# Patient Record
Sex: MALE | URBAN - METROPOLITAN AREA
[De-identification: names, ages, dates, MRNs, and addresses within clinical notes are randomized per-mention and may not be internally consistent; named-entity substitution may affect disease eponyms.]

---

## 2021-02-12 ENCOUNTER — IMMUNIZATIONS (OUTPATIENT)
Dept: FAMILY MEDICINE CLINIC | Facility: HOSPITAL | Age: 71
End: 2021-02-12

## 2021-02-12 DIAGNOSIS — Z23 ENCOUNTER FOR IMMUNIZATION: Primary | ICD-10-CM

## 2021-02-12 PROCEDURE — 91301 SARS-COV-2 / COVID-19 MRNA VACCINE (MODERNA) 100 MCG: CPT

## 2021-02-12 PROCEDURE — 0011A SARS-COV-2 / COVID-19 MRNA VACCINE (MODERNA) 100 MCG: CPT

## 2021-03-11 ENCOUNTER — IMMUNIZATIONS (OUTPATIENT)
Dept: FAMILY MEDICINE CLINIC | Facility: HOSPITAL | Age: 71
End: 2021-03-11

## 2021-03-11 DIAGNOSIS — Z23 ENCOUNTER FOR IMMUNIZATION: Primary | ICD-10-CM

## 2021-03-11 PROCEDURE — 91301 SARS-COV-2 / COVID-19 MRNA VACCINE (MODERNA) 100 MCG: CPT

## 2021-03-11 PROCEDURE — 0012A SARS-COV-2 / COVID-19 MRNA VACCINE (MODERNA) 100 MCG: CPT

## 2023-02-23 ENCOUNTER — TELEPHONE (OUTPATIENT)
Dept: UROLOGY | Facility: AMBULATORY SURGERY CENTER | Age: 73
End: 2023-02-23

## 2023-02-23 NOTE — TELEPHONE ENCOUNTER
Patient calling to confirm fax number for jocelynn to fax records  Confirmed fax number [phone]    Patient will call back to schedule appt

## 2023-05-08 ENCOUNTER — OFFICE VISIT (OUTPATIENT)
Dept: UROLOGY | Facility: CLINIC | Age: 73
End: 2023-05-08

## 2023-05-08 VITALS
WEIGHT: 194.8 LBS | DIASTOLIC BLOOD PRESSURE: 90 MMHG | TEMPERATURE: 98.1 F | HEIGHT: 66 IN | HEART RATE: 67 BPM | OXYGEN SATURATION: 97 % | BODY MASS INDEX: 31.31 KG/M2 | SYSTOLIC BLOOD PRESSURE: 148 MMHG

## 2023-05-08 DIAGNOSIS — R35.1 BPH ASSOCIATED WITH NOCTURIA: Primary | ICD-10-CM

## 2023-05-08 DIAGNOSIS — N40.1 BPH ASSOCIATED WITH NOCTURIA: Primary | ICD-10-CM

## 2023-05-08 LAB
SL AMB  POCT GLUCOSE, UA: NORMAL
SL AMB LEUKOCYTE ESTERASE,UA: NORMAL
SL AMB POCT BILIRUBIN,UA: NORMAL
SL AMB POCT BLOOD,UA: NORMAL
SL AMB POCT CLARITY,UA: CLEAR
SL AMB POCT COLOR,UA: YELLOW
SL AMB POCT KETONES,UA: NORMAL
SL AMB POCT NITRITE,UA: NORMAL
SL AMB POCT PH,UA: 7
SL AMB POCT SPECIFIC GRAVITY,UA: 1.01
SL AMB POCT URINE PROTEIN: NORMAL
SL AMB POCT UROBILINOGEN: 0.2

## 2023-05-08 RX ORDER — TADALAFIL 5 MG/1
5 TABLET ORAL DAILY PRN
Qty: 90 TABLET | Refills: 3 | Status: SHIPPED | OUTPATIENT
Start: 2023-05-08

## 2023-05-08 RX ORDER — SIMVASTATIN 20 MG
TABLET ORAL
COMMUNITY
Start: 2023-03-13

## 2023-05-08 RX ORDER — TAMSULOSIN HYDROCHLORIDE 0.4 MG/1
0.4 CAPSULE ORAL
Qty: 90 CAPSULE | Refills: 3 | Status: SHIPPED | OUTPATIENT
Start: 2023-05-08

## 2023-05-08 NOTE — PROGRESS NOTES
UROLOGY PROGRESS NOTE         NAME: Nicole Dubon  AGE: 74 y o  SEX: male  : 1950   MRN: [id number]    DATE: 2023  TIME: 11:50 AM    Assessment and Plan      Impression:   1  BPH associated with nocturia  -     POCT urine dip auto non-scope  -     PSA Total, Diagnostic; Future  -     tadalafil (CIALIS) 5 MG tablet; Take 1 tablet (5 mg total) by mouth daily as needed for erectile dysfunction  -     tamsulosin (FLOMAX) 0 4 mg; Take 1 capsule (0 4 mg total) by mouth daily with dinner      Status post UroLift 2-1/2 years ago  Symptoms slowly worsening  Back on Flomax  AUA symptom score 19  Options discussed  Remains sexually active   Plan: Add Cialis 5 mg daily  They will use good Rx  Continue the Flomax  He is due for PSA  Slip given  Follow-up 1 year unless troubles      Chief Complaint   No chief complaint on file  History of Present Illness     HPI: Nicole Dubon is a 74y o  year old male who presents with follow-up with regard to his BPH  Back on Flomax 1 daily  Still with some bothersome symptoms despite the UroLift procedure done 2 and half years ago  No gross hematuria  No incontinence  He does have some spraying of his flow with difficulty controlling at times  Options all discussed  The following portions of the patient's history were reviewed and updated as appropriate: allergies, current medications, past family history, past medical history, past social history, past surgical history and problem list   Past Medical History:   Diagnosis Date   • Bladder diverticulum    • BPH (benign prostatic hyperplasia)    • Urinary retention      Past Surgical History:   Procedure Laterality Date   • INGUINAL HERNIA REPAIR     • OTHER SURGICAL HISTORY  10/22/2020    urolift     shoulder  Review of Systems     Const: Denies chills, fever and weight loss  CV: Denies chest pain  Resp: Denies SOB  GI: Denies abdominal pain, nausea and vomiting    : Denies symptoms other "than stated above  Musculo: Denies back pain  Objective   /90 (BP Location: Left arm, Patient Position: Sitting, Cuff Size: Adult)   Pulse 67   Temp 98 1 °F (36 7 °C)   Ht 5' 6\" (1 676 m)   Wt 88 4 kg (194 lb 12 8 oz)   SpO2 97%   BMI 31 44 kg/m²     Physical Exam  Const: Appears healthy and well developed  No signs of acute distress present  Resp: Respirations are regular and unlabored  CV: Rate is regular  Rhythm is regular  Abdomen: Abdomen is soft, nontender, and nondistended  Kidneys are not palpable  : Exam benign  Prostate 1+ benign  No meatal stenosis  Psych: Patient's attitude is cooperative   Mood is normal  Affect is normal     Current Medications     Current Outpatient Medications:   •  APPLE CIDER VINEGAR PO, Take 600 mg by mouth, Disp: , Rfl:   •  aspirin (ECOTRIN LOW STRENGTH) 81 mg EC tablet, Take 81 mg by mouth, Disp: , Rfl:   •  Cholecalciferol 25 MCG (1000 UT) tablet, Take 1,000 Units by mouth, Disp: , Rfl:   •  Coenzyme Q10 10 MG capsule, Take 100 mg by mouth, Disp: , Rfl:   •  Diclofenac Sodium (VOLTAREN) 1 %, Apply 2 g topically 4 (four) times a day, Disp: , Rfl:   •  loratadine (CLARITIN) 10 mg tablet, Take 10 mg by mouth, Disp: , Rfl:   •  Multiple Vitamin (MULTIVITAMIN PO), Take 1 tablet by mouth daily, Disp: , Rfl:   •  Omega-3 Fatty Acids (FISH OIL PO), Take 1,400 mg by mouth, Disp: , Rfl:   •  tadalafil (CIALIS) 5 MG tablet, Take 1 tablet (5 mg total) by mouth daily as needed for erectile dysfunction, Disp: 90 tablet, Rfl: 3  •  tamsulosin (FLOMAX) 0 4 mg, Take 1 capsule (0 4 mg total) by mouth daily with dinner, Disp: 90 capsule, Rfl: 3  •  simvastatin (ZOCOR) 20 mg tablet, TAKE 1 TABLET BY MOUTH EVERY DAY AT NIGHT, Disp: , Rfl:   •  triamcinolone (KENALOG) 0 1 % ointment, APPLY TO AFFECTED AREA EVERY DAY - SPARINGLY, Disp: , Rfl:   •  TURMERIC PO, Take 500 mg by mouth (Patient not taking: Reported on 5/8/2023), Disp: , Rfl:         Jeremy Pop, MD        "

## 2024-03-05 ENCOUNTER — NURSE TRIAGE (OUTPATIENT)
Age: 74
End: 2024-03-05

## 2024-03-05 DIAGNOSIS — N40.1 BPH ASSOCIATED WITH NOCTURIA: Primary | ICD-10-CM

## 2024-03-05 DIAGNOSIS — R35.1 BPH ASSOCIATED WITH NOCTURIA: Primary | ICD-10-CM

## 2024-03-05 NOTE — TELEPHONE ENCOUNTER
Patient's wife called. Patient had triple cardiac bypass on 2/23/24 and was taken of Cialis and told he cannot be on that. Inquiring if there is another med he can be put on for the BPH as he is having frequent urination now that he is off the Cialis.     # [phone]

## 2024-03-15 RX ORDER — TAMSULOSIN HYDROCHLORIDE 0.4 MG/1
0.4 CAPSULE ORAL 2 TIMES DAILY
Qty: 180 CAPSULE | Refills: 3 | Status: SHIPPED | OUTPATIENT
Start: 2024-03-15

## 2024-03-15 NOTE — TELEPHONE ENCOUNTER
Pt's wife called back. Informed her of MD's note to take flomax twice a day, morning and evening and does not need to take with food. Advised a 90 day script was sent to pharmacy. No further assistance needed.

## 2024-05-06 RX ORDER — POTASSIUM CHLORIDE 1500 MG/1
20 TABLET, EXTENDED RELEASE ORAL DAILY
COMMUNITY
Start: 2024-02-28

## 2024-05-06 RX ORDER — NITROGLYCERIN 0.4 MG/1
0.3 TABLET SUBLINGUAL
COMMUNITY
Start: 2024-02-08

## 2024-05-06 RX ORDER — FUROSEMIDE 40 MG/1
40 TABLET ORAL DAILY
COMMUNITY
Start: 2024-02-28

## 2024-05-06 RX ORDER — AMIODARONE HYDROCHLORIDE 200 MG/1
200 TABLET ORAL DAILY
COMMUNITY
Start: 2024-02-28

## 2024-05-06 RX ORDER — OXYCODONE HYDROCHLORIDE 5 MG/1
5 TABLET ORAL EVERY 4 HOURS PRN
COMMUNITY
Start: 2024-02-28

## 2024-05-06 RX ORDER — CLOPIDOGREL BISULFATE 75 MG/1
75 TABLET ORAL DAILY
COMMUNITY
Start: 2024-02-28

## 2024-05-06 RX ORDER — METOPROLOL SUCCINATE 25 MG/1
25 TABLET, EXTENDED RELEASE ORAL DAILY
COMMUNITY
Start: 2024-02-08

## 2024-05-08 ENCOUNTER — OFFICE VISIT (OUTPATIENT)
Dept: UROLOGY | Facility: CLINIC | Age: 74
End: 2024-05-08
Payer: COMMERCIAL

## 2024-05-08 VITALS
WEIGHT: 171.5 LBS | TEMPERATURE: 98.2 F | DIASTOLIC BLOOD PRESSURE: 64 MMHG | SYSTOLIC BLOOD PRESSURE: 122 MMHG | HEART RATE: 68 BPM | OXYGEN SATURATION: 99 % | BODY MASS INDEX: 26.92 KG/M2 | HEIGHT: 67 IN

## 2024-05-08 DIAGNOSIS — R35.1 BENIGN PROSTATIC HYPERPLASIA WITH NOCTURIA: Primary | ICD-10-CM

## 2024-05-08 DIAGNOSIS — N40.1 BPH ASSOCIATED WITH NOCTURIA: ICD-10-CM

## 2024-05-08 DIAGNOSIS — R35.1 BPH ASSOCIATED WITH NOCTURIA: ICD-10-CM

## 2024-05-08 DIAGNOSIS — N40.1 BENIGN PROSTATIC HYPERPLASIA WITH NOCTURIA: Primary | ICD-10-CM

## 2024-05-08 PROCEDURE — 99214 OFFICE O/P EST MOD 30 MIN: CPT | Performed by: UROLOGY

## 2024-05-08 RX ORDER — TAMSULOSIN HYDROCHLORIDE 0.4 MG/1
0.4 CAPSULE ORAL 2 TIMES DAILY
Qty: 180 CAPSULE | Refills: 3 | Status: SHIPPED | OUTPATIENT
Start: 2024-05-08

## 2024-05-08 NOTE — PROGRESS NOTES
UROLOGY PROGRESS NOTE         NAME: Paulo Motta  AGE: 74 y.o. SEX: male  : 1950   MRN: [id number]    DATE: 2024  TIME: 11:28 AM    Assessment and Plan      Impression:   1. Benign prostatic hyperplasia with nocturia    Patient just had open heart surgery.  Nocturia twice a night.  Voiding relatively small amounts with slower flow but is comfortable.  He is sexually active and was able to function without the Cialis.  Is been discontinued since he had open heart surgery.     Plan: Will see him in a year.  Continue Flomax twice daily.  Consider procedure in the future.  Restart Cialis when cardiologist improvement.      Chief Complaint     Chief Complaint   Patient presents with    Follow-up     History of Present Illness     HPI: Paulo Motta is a 74 y.o. year old male who presents with history of BPH status post UroLift a few years ago.  His symptoms have slowly returned.  He is currently on twice daily Flomax since his Cialis was discontinued.  He was doing well on a combination of Flomax and Cialis.  He had open heart surgery 9 weeks ago.  The Cialis was discontinued at that time.  He has not needed any nitrates however.  He is just getting back to exercise.  Nocturia 2 times a night.  Slower flow.  He does not double void.  Had some urgency however doubling the Flomax has seemed to help that.  No incontinence.  No hematuria.  No dysuria.  He and his wife were able to have intercourse last night without Cialis.              The following portions of the patient's history were reviewed and updated as appropriate: allergies, current medications, past family history, past medical history, past social history, past surgical history and problem list.  Past Medical History:   Diagnosis Date    Bladder diverticulum     BPH (benign prostatic hyperplasia)     with nocturia    Urinary retention      Past Surgical History:   Procedure Laterality Date    INGUINAL HERNIA REPAIR      OTHER SURGICAL  "HISTORY  10/22/2020    urolift     shoulder  Review of Systems     Const: Denies chills, fever and weight loss.  CV: Denies chest pain.  Resp: Denies SOB.  GI: Denies abdominal pain, nausea and vomiting.  : Denies symptoms other than stated above.  Musculo: Denies back pain.    Objective   /64 (BP Location: Left arm, Patient Position: Sitting, Cuff Size: Adult)   Pulse 68   Temp 98.2 °F (36.8 °C) (Temporal)   Ht 5' 7\" (1.702 m)   Wt 77.8 kg (171 lb 8 oz)   SpO2 99%   BMI 26.86 kg/m²     Physical Exam  Const: Appears healthy and well developed. No signs of acute distress present.  Resp: Respirations are regular and unlabored.   CV: Rate is regular. Rhythm is regular.  Abdomen: Abdomen is soft, nontender, and nondistended. Kidneys are not palpable.  : Penis testicles epididymis normal no suprapubic tenderness.  Prostate 1+ benign.  Psych: Patient's attitude is cooperative. Mood is normal. Affect is normal.    Current Medications     Current Outpatient Medications:     amiodarone 200 mg tablet, Take 200 mg by mouth daily, Disp: , Rfl:     APPLE CIDER VINEGAR PO, Take 600 mg by mouth, Disp: , Rfl:     aspirin (ECOTRIN LOW STRENGTH) 81 mg EC tablet, Take 81 mg by mouth, Disp: , Rfl:     ASPIRIN LOW DOSE ADULT PO, Take 81 mg by mouth daily, Disp: , Rfl:     Cholecalciferol 25 MCG (1000 UT) tablet, Take 1,000 Units by mouth, Disp: , Rfl:     clopidogrel (PLAVIX) 75 mg tablet, Take 75 mg by mouth daily, Disp: , Rfl:     Coenzyme Q10 10 MG capsule, Take 100 mg by mouth, Disp: , Rfl:     Diclofenac Sodium (VOLTAREN) 1 %, Apply 2 g topically 4 (four) times a day, Disp: , Rfl:     diphenhydrAMINE-acetaminophen (TYLENOL PM)  MG TABS, Take 1 tablet by mouth daily at bedtime as needed, Disp: , Rfl:     DOCUSATE SODIUM PO, Take 1 caplet by mouth in the morning, Disp: , Rfl:     loratadine (CLARITIN) 10 mg tablet, Take 10 mg by mouth, Disp: , Rfl:     metoprolol succinate (TOPROL-XL) 25 mg 24 hr tablet, Take 25 " mg by mouth daily, Disp: , Rfl:     Multiple Vitamin (MULTIVITAMIN PO), Take 1 tablet by mouth daily, Disp: , Rfl:     nitroglycerin (NITROSTAT) 0.4 mg SL tablet, Place 0.3 mg under the tongue every 5 (five) minutes as needed, Disp: , Rfl:     Omega-3 Fatty Acids (FISH OIL PO), Take 1,400 mg by mouth, Disp: , Rfl:     Psyllium 0.36 g CAPS, Take 1 packet by mouth in the morning, Disp: , Rfl:     simvastatin (ZOCOR) 20 mg tablet, TAKE 1 TABLET BY MOUTH EVERY DAY AT NIGHT, Disp: , Rfl:     tadalafil (CIALIS) 5 MG tablet, Take 1 tablet (5 mg total) by mouth daily as needed for erectile dysfunction, Disp: 90 tablet, Rfl: 3    tamsulosin (FLOMAX) 0.4 mg, Take 1 capsule (0.4 mg total) by mouth daily with dinner, Disp: 90 capsule, Rfl: 3    tamsulosin (FLOMAX) 0.4 mg, Take 1 capsule (0.4 mg total) by mouth 2 (two) times a day, Disp: 180 capsule, Rfl: 3    triamcinolone (KENALOG) 0.1 % ointment, APPLY TO AFFECTED AREA EVERY DAY - SPARINGLY, Disp: , Rfl:     Ubiquinol 100 MG CAPS, Take 1 each by mouth in the morning, Disp: , Rfl:     furosemide (LASIX) 40 mg tablet, Take 40 mg by mouth daily (Patient not taking: Reported on 5/8/2024), Disp: , Rfl:     oxyCODONE (ROXICODONE) 5 immediate release tablet, Take 5 mg by mouth every 4 (four) hours as needed (Patient not taking: Reported on 5/8/2024), Disp: , Rfl:     Potassium Chloride ER 20 MEQ TBCR, Take 20 mEq by mouth in the morning (Patient not taking: Reported on 5/8/2024), Disp: , Rfl:     TURMERIC PO, Take 500 mg by mouth (Patient not taking: Reported on 5/8/2023), Disp: , Rfl:         Frank D'Amico, MD

## 2024-06-20 ENCOUNTER — NURSE TRIAGE (OUTPATIENT)
Age: 74
End: 2024-06-20

## 2024-06-20 DIAGNOSIS — N40.1 BPH ASSOCIATED WITH NOCTURIA: ICD-10-CM

## 2024-06-20 DIAGNOSIS — R35.1 BPH ASSOCIATED WITH NOCTURIA: ICD-10-CM

## 2024-06-20 RX ORDER — TAMSULOSIN HYDROCHLORIDE 0.4 MG/1
0.4 CAPSULE ORAL 2 TIMES DAILY
Qty: 180 CAPSULE | Refills: 3 | Status: SHIPPED | OUTPATIENT
Start: 2024-06-20

## 2024-06-20 RX ORDER — TADALAFIL 5 MG/1
5 TABLET ORAL DAILY PRN
Qty: 90 TABLET | Refills: 1 | Status: SHIPPED | OUTPATIENT
Start: 2024-06-20 | End: 2024-06-21 | Stop reason: SDUPTHER

## 2024-06-20 NOTE — TELEPHONE ENCOUNTER
Patient called states cardiologist cleared him to restart the Cialis *Must be taken to [facility] pharmacy like last time for discount*        Also requesting 90 day supply of tamsulosin be sent in to be taken 1 daily. States no longer needs to take BID. *send to [facility]*   No

## 2024-06-21 RX ORDER — TADALAFIL 5 MG/1
5 TABLET ORAL DAILY PRN
Qty: 90 TABLET | Refills: 0 | Status: SHIPPED | OUTPATIENT
Start: 2024-06-21 | End: 2024-06-28 | Stop reason: SDUPTHER

## 2024-06-21 RX ORDER — FLUTICASONE PROPIONATE 50 MCG
1 SPRAY, SUSPENSION (ML) NASAL DAILY
COMMUNITY
Start: 2024-05-30 | End: 2025-05-30

## 2024-06-21 RX ORDER — ROSUVASTATIN CALCIUM 10 MG/1
10 TABLET, COATED ORAL DAILY
COMMUNITY
Start: 2024-06-17

## 2024-06-21 NOTE — TELEPHONE ENCOUNTER
Hi   I reviewed the Cardiologists notes from his last visit.  It appears the Cardiologist is aware he needs Cialis for his BPH.   Dr. D'Amico also noted he can resume Cialis with Cardiology approval in his office note from 5/8/24.  Please be sure Paulo knows it is very important that he does not use Nitroglycerin with this medication if he has any remaining tablets at home

## 2024-06-21 NOTE — TELEPHONE ENCOUNTER
Spoke with pt's wife, she  is questioning should Cialis be every day or everyday PRN?  Before the heart surgery he was taking Cialis everyday and felt better taking it jsut daily.  Should flomax be one pill a day?  He did not feel good on flomax 2 pills a day?  Please advise. Then I will call wife back.

## 2024-06-28 DIAGNOSIS — N40.1 BPH ASSOCIATED WITH NOCTURIA: ICD-10-CM

## 2024-06-28 DIAGNOSIS — R35.1 BPH ASSOCIATED WITH NOCTURIA: ICD-10-CM

## 2024-06-28 RX ORDER — TADALAFIL 5 MG/1
5 TABLET ORAL DAILY PRN
Qty: 90 TABLET | Refills: 0 | Status: SHIPPED | OUTPATIENT
Start: 2024-06-28

## 2024-07-01 ENCOUNTER — TELEPHONE (OUTPATIENT)
Age: 74
End: 2024-07-01

## 2024-07-01 NOTE — TELEPHONE ENCOUNTER
PA for TALADIFIL Approved     Date(s) approved UNTIL 06/30/2025        Patient advised by          [x] MyChart Message  [] Phone call   []LMOM  []L/M to call office as no active Communication consent on file  []Unable to leave detailed message as VM not approved on Communication consent       Pharmacy advised by    [x]Fax  []Phone call    Approval letter scanned into Media Yes

## 2024-07-01 NOTE — TELEPHONE ENCOUNTER
PA for TADALIFIL    Submitted via    [x]CMM-KEY [id number]  []Surescripts-Case ID #   []Faxed to plan   []Other website   []Phone call Case ID #     Office notes sent, clinical questions answered. Awaiting determination    Turnaround time for your insurance to make a decision on your Prior Authorization can take 7-21 business days.

## 2024-10-28 ENCOUNTER — TELEPHONE (OUTPATIENT)
Age: 74
End: 2024-10-28

## 2024-10-28 RX ORDER — ROSUVASTATIN CALCIUM 20 MG/1
1 TABLET, COATED ORAL DAILY
COMMUNITY
Start: 2024-10-05

## 2024-10-28 RX ORDER — SENNOSIDES A AND B 8.6 MG/1
8.6 TABLET, FILM COATED ORAL 2 TIMES DAILY
COMMUNITY
Start: 2024-10-23

## 2024-10-28 RX ORDER — OXYCODONE HYDROCHLORIDE 5 MG/1
5 TABLET ORAL EVERY 4 HOURS PRN
COMMUNITY
Start: 2024-10-23

## 2024-10-28 NOTE — TELEPHONE ENCOUNTER
Pt's wife Yessy called and stated the pt recently had a knee replacement and after that was seen in the ER for urinary retention where a catheter was put in place. The pt's PCP recommended following up with Dr.D'Amico to determine how long the catheter needs to remain in place. Please review for appropriate appointment.     Call back: [phone]

## 2024-10-31 ENCOUNTER — OFFICE VISIT (OUTPATIENT)
Dept: UROLOGY | Facility: CLINIC | Age: 74
End: 2024-10-31
Payer: COMMERCIAL

## 2024-10-31 ENCOUNTER — TELEPHONE (OUTPATIENT)
Dept: UROLOGY | Facility: CLINIC | Age: 74
End: 2024-10-31

## 2024-10-31 VITALS
WEIGHT: 170 LBS | TEMPERATURE: 97.3 F | SYSTOLIC BLOOD PRESSURE: 150 MMHG | DIASTOLIC BLOOD PRESSURE: 78 MMHG | HEIGHT: 65 IN | OXYGEN SATURATION: 99 % | BODY MASS INDEX: 28.32 KG/M2 | HEART RATE: 97 BPM

## 2024-10-31 DIAGNOSIS — R35.1 BENIGN PROSTATIC HYPERPLASIA WITH NOCTURIA: Primary | ICD-10-CM

## 2024-10-31 DIAGNOSIS — N40.1 BENIGN PROSTATIC HYPERPLASIA WITH NOCTURIA: Primary | ICD-10-CM

## 2024-10-31 PROCEDURE — 99214 OFFICE O/P EST MOD 30 MIN: CPT | Performed by: UROLOGY

## 2024-10-31 PROCEDURE — 51700 IRRIGATION OF BLADDER: CPT | Performed by: UROLOGY

## 2024-10-31 RX ORDER — CEFUROXIME AXETIL 500 MG/1
500 TABLET ORAL EVERY 12 HOURS SCHEDULED
Qty: 14 TABLET | Refills: 0 | Status: SHIPPED | OUTPATIENT
Start: 2024-10-31 | End: 2024-11-07

## 2024-10-31 NOTE — PROGRESS NOTES
"Universal Protocol:  procedure performed by consultantConsent: Verbal consent obtained.  Risks and benefits: risks, benefits and alternatives were discussed  Consent given by: patient  Time out: Immediately prior to procedure a \"time out\" was called to verify the correct patient, procedure, equipment, support staff and site/side marked as required.  Timeout called at: 10/31/2024 10:50 AM.  Patient identity confirmed: verbally with patient    Bladder catheterization    Date/Time: 10/31/2024 11:00 AM    Performed by: Frank D'Amico, MD  Authorized by: Frank D'Amico, MD    Patient location:  Bedside  Consent:     Consent given by:  Patient  Universal protocol:     Immediately prior to procedure a time out was called: yes      Patient identity confirmed:  Verbally with patient  Pre-procedure details:     Procedure purpose:  Therapeutic  Anesthesia (see MAR for exact dosages):     Anesthesia method:  None  Procedure details:     Bladder irrigation: yes      Catheter insertion:  Non-indwelling    Approach: natural orifice      Catheter type:  Coude    Catheter size:  14 Fr    Number of attempts:  1    Successful placement: yes      Urine characteristics:  Clear    Provider performed due to:  Complicated insertion  Post-procedure details:     Patient tolerance of procedure:  Tolerated well, no immediate complications  Comments:      Patient initially had his bladder filled via his Genao with irrigation.  240 cc put in.  The patient was able to void 140 cc.  The patient was taught how to catheterize with a coudé catheter and started on intermittent catheterization.  A coudé catheter was required.  Regular catheter does not go in easily.  Patient and his wife were taught how to do this.  Patient will need to catheterize up to 4 times daily.  Dose of Keflex given today.  Started on Keflex.  Will see him back in 4 to 6 weeks.  He will continue his Flomax and Cialis.  No fever chills no nausea and vomiting.  No " hematuria.    UROLOGY PROGRESS NOTE         NAME: Paulo Motta  AGE: 74 y.o. SEX: male  : 1950   MRN: [id number]    DATE: 10/31/2024  TIME: 10:53 AM    Assessment and Plan      Impression:   1. Benign prostatic hyperplasia with nocturia  -     Bladder catheterization  Patient developed urinary retention after his knee surgery.  He is here for voiding trial today.  He was able to void some with a very slow flow over quite a long period of time.  He was taught how to do intermittent catheterization today.  Coudé catheter is required.  He and his wife will cath every 6 hours.  We will get him back for cystoscopy in 4 to 6 weeks.  He will continue the Flomax and Cialis.     Plan: As above.      Chief Complaint     Chief Complaint   Patient presents with    void trial     History of Present Illness     HPI: Paulo Motta is a 74 y.o. year old male who presents with patient had a UroLift procedure a few years ago.  He has had slowly worsening symptoms.  He has had open heart surgery and now knee surgery a week ago.  He developed urinary retention and had a Genao catheter placed in the ER.  He is here for voiding trial today.  He had his bladder filled via irrigation.  He was unable to get it all out with a very slow flow.  Subsequently he was started on intermittent catheterization.  Coudé catheter required.  He and his wife were taught how to do it.  Super regular catheter does not go in easily.  They will cath 4 times a day.  And we will do it indefinitely.    Regular catheter would not go in due to an enlarged prostate gland.              The following portions of the patient's history were reviewed and updated as appropriate: allergies, current medications, past family history, past medical history, past social history, past surgical history and problem list.  Past Medical History:   Diagnosis Date    Bladder diverticulum     BPH (benign prostatic hyperplasia)     with nocturia    Urinary retention   "    Past Surgical History:   Procedure Laterality Date    INGUINAL HERNIA REPAIR      KNEE ARTHROPLASTY Left 10/22/2024    OTHER SURGICAL HISTORY  10/22/2020    urolift     shoulder  Review of Systems     Const: Denies chills, fever and weight loss.  CV: Denies chest pain.  Resp: Denies SOB.  GI: Denies abdominal pain, nausea and vomiting.  : Denies symptoms other than stated above.  Musculo: Denies back pain.    Objective   /78   Pulse 97   Temp (!) 97.3 °F (36.3 °C)   Ht 5' 5\" (1.651 m)   Wt 77.1 kg (170 lb)   SpO2 99%   BMI 28.29 kg/m²     Physical Exam  Const: Appears healthy and well developed. No signs of acute distress present.  Resp: Respirations are regular and unlabored.   CV: Rate is regular. Rhythm is regular.  Abdomen: Abdomen is soft, nontender, and nondistended. Kidneys are not palpable.  : Penis testicles okay.  No SP tenderness.  Psych: Patient's attitude is cooperative. Mood is normal. Affect is normal.    Current Medications     Current Outpatient Medications:     APPLE CIDER VINEGAR PO, Take 600 mg by mouth, Disp: , Rfl:     clopidogrel (PLAVIX) 75 mg tablet, Take 75 mg by mouth daily, Disp: , Rfl:     Coenzyme Q10 10 MG capsule, Take 100 mg by mouth, Disp: , Rfl:     Diclofenac Sodium (VOLTAREN) 1 %, Apply 2 g topically 4 (four) times a day, Disp: , Rfl:     diphenhydrAMINE-acetaminophen (TYLENOL PM)  MG TABS, Take 1 tablet by mouth daily at bedtime as needed, Disp: , Rfl:     DOCUSATE SODIUM PO, Take 1 caplet by mouth in the morning, Disp: , Rfl:     metoprolol succinate (TOPROL-XL) 25 mg 24 hr tablet, Take 25 mg by mouth daily, Disp: , Rfl:     Multiple Vitamin (MULTIVITAMIN PO), Take 1 tablet by mouth daily, Disp: , Rfl:     Omega-3 Fatty Acids (FISH OIL PO), Take 1,400 mg by mouth, Disp: , Rfl:     oxyCODONE (ROXICODONE) 5 immediate release tablet, Take 5 mg by mouth every 4 (four) hours as needed, Disp: , Rfl:     Psyllium 0.36 g CAPS, Take 1 packet by mouth in the " morning, Disp: , Rfl:     rosuvastatin (CRESTOR) 20 MG tablet, Take 1 tablet by mouth in the morning, Disp: , Rfl:     tadalafil (CIALIS) 5 MG tablet, Take 1 tablet (5 mg total) by mouth daily as needed for erectile dysfunction, Disp: 90 tablet, Rfl: 0    triamcinolone (KENALOG) 0.1 % ointment, APPLY TO AFFECTED AREA EVERY DAY - SPARINGLY, Disp: , Rfl:     Ubiquinol 100 MG CAPS, Take 1 each by mouth in the morning, Disp: , Rfl:     Acetaminophen 160 MG TBDP, Take 1,000 mg by mouth 2 (two) times a day (Patient not taking: Reported on 10/31/2024), Disp: , Rfl:     fluticasone (FLONASE) 50 mcg/act nasal spray, 1 spray into each nostril daily (Patient not taking: Reported on 10/31/2024), Disp: , Rfl:     senna (Senokot) 8.6 MG tablet, Take 8.6 mg by mouth 2 (two) times a day (Patient not taking: Reported on 10/31/2024), Disp: , Rfl:         Frank D'Amico, MD

## 2024-10-31 NOTE — TELEPHONE ENCOUNTER
Pt aware that [address] [place] [address] will be supplying his catheters. Contact info given.  Faxed to [address] [place] [address], confirmation received.

## 2024-12-09 ENCOUNTER — PROCEDURE VISIT (OUTPATIENT)
Dept: UROLOGY | Facility: CLINIC | Age: 74
End: 2024-12-09
Payer: COMMERCIAL

## 2024-12-09 VITALS
OXYGEN SATURATION: 98 % | WEIGHT: 166 LBS | HEIGHT: 65 IN | DIASTOLIC BLOOD PRESSURE: 68 MMHG | SYSTOLIC BLOOD PRESSURE: 120 MMHG | BODY MASS INDEX: 27.66 KG/M2 | HEART RATE: 98 BPM | TEMPERATURE: 96.7 F

## 2024-12-09 DIAGNOSIS — R35.1 BPH ASSOCIATED WITH NOCTURIA: ICD-10-CM

## 2024-12-09 DIAGNOSIS — N40.1 BENIGN PROSTATIC HYPERPLASIA WITH NOCTURIA: Primary | ICD-10-CM

## 2024-12-09 DIAGNOSIS — R35.1 BENIGN PROSTATIC HYPERPLASIA WITH NOCTURIA: Primary | ICD-10-CM

## 2024-12-09 DIAGNOSIS — N40.1 BPH ASSOCIATED WITH NOCTURIA: ICD-10-CM

## 2024-12-09 LAB
SL AMB  POCT GLUCOSE, UA: ABNORMAL
SL AMB LEUKOCYTE ESTERASE,UA: ABNORMAL
SL AMB POCT BILIRUBIN,UA: ABNORMAL
SL AMB POCT BLOOD,UA: ABNORMAL
SL AMB POCT CLARITY,UA: ABNORMAL
SL AMB POCT COLOR,UA: YELLOW
SL AMB POCT KETONES,UA: ABNORMAL
SL AMB POCT NITRITE,UA: ABNORMAL
SL AMB POCT PH,UA: 6
SL AMB POCT SPECIFIC GRAVITY,UA: 1.03
SL AMB POCT URINE PROTEIN: ABNORMAL
SL AMB POCT UROBILINOGEN: 0.2

## 2024-12-09 PROCEDURE — 87077 CULTURE AEROBIC IDENTIFY: CPT | Performed by: UROLOGY

## 2024-12-09 PROCEDURE — 81003 URINALYSIS AUTO W/O SCOPE: CPT | Performed by: UROLOGY

## 2024-12-09 PROCEDURE — 87186 SC STD MICRODIL/AGAR DIL: CPT | Performed by: UROLOGY

## 2024-12-09 PROCEDURE — 52000 CYSTOURETHROSCOPY: CPT | Performed by: UROLOGY

## 2024-12-09 PROCEDURE — 87086 URINE CULTURE/COLONY COUNT: CPT | Performed by: UROLOGY

## 2024-12-09 RX ORDER — SULFAMETHOXAZOLE AND TRIMETHOPRIM 800; 160 MG/1; MG/1
1 TABLET ORAL EVERY 12 HOURS SCHEDULED
Qty: 20 TABLET | Refills: 0 | Status: SHIPPED | OUTPATIENT
Start: 2024-12-09 | End: 2024-12-19

## 2024-12-09 RX ORDER — ASPIRIN 81 MG/1
81 TABLET, CHEWABLE ORAL DAILY
COMMUNITY

## 2024-12-09 RX ORDER — TRIAMCINOLONE ACETONIDE 1 MG/G
1 CREAM TOPICAL 2 TIMES DAILY
COMMUNITY
Start: 2024-12-05

## 2024-12-09 RX ORDER — TADALAFIL 5 MG/1
5 TABLET ORAL DAILY PRN
Qty: 90 TABLET | Refills: 3 | Status: SHIPPED | OUTPATIENT
Start: 2024-12-09

## 2024-12-09 NOTE — PROGRESS NOTES
"   Cystoscopy     Date/Time  12/9/2024 9:15 AM     Performed by  Frank D'Amico, MD   Authorized by  Frank D'Amico, MD     Universal Protocol:  procedure performed by consultantConsent: Verbal consent obtained. Written consent obtained.  Risks and benefits: risks, benefits and alternatives were discussed  Consent given by: patient  Time out: Immediately prior to procedure a \"time out\" was called to verify the correct patient, procedure, equipment, support staff and site/side marked as required.  Timeout called at: 12/9/2024 9:34 AM.  Patient identity confirmed: verbally with patient      Procedure Details:  Procedure type: cystoscopy    Patient tolerance: Patient tolerated the procedure well with no immediate complications    Additional Procedure Details: Flexible digital cystoscopy was carried out.  Supine position.  Prepped with Betadine lidocaine jelly.  Cystoscopy reveals normal urethra large prostate with a large median lobe.  I was unable to assess the bladder because his urine is grossly infected.  His bladder surface was inflamed.  We cathed him for almost 200 cc.  He will stop cathing.  Urine sent for culture will get him on some Bactrim.  Will see him back for another cystoscopy in a few weeks.  Continue Cialis and Flomax for now.      "

## 2024-12-11 LAB — BACTERIA UR CULT: ABNORMAL

## 2024-12-17 ENCOUNTER — RESULTS FOLLOW-UP (OUTPATIENT)
Dept: UROLOGY | Facility: CLINIC | Age: 74
End: 2024-12-17

## 2024-12-17 DIAGNOSIS — N40.1 BENIGN PROSTATIC HYPERPLASIA WITH NOCTURIA: Primary | ICD-10-CM

## 2024-12-17 DIAGNOSIS — R35.1 BENIGN PROSTATIC HYPERPLASIA WITH NOCTURIA: Primary | ICD-10-CM

## 2024-12-17 RX ORDER — CIPROFLOXACIN 500 MG/1
500 TABLET, FILM COATED ORAL EVERY 12 HOURS SCHEDULED
Qty: 14 TABLET | Refills: 0 | Status: SHIPPED | OUTPATIENT
Start: 2024-12-17 | End: 2024-12-24

## 2024-12-17 NOTE — TELEPHONE ENCOUNTER
----- Message from Frank D'Amico, MD sent at 12/17/2024 12:05 PM EST -----  Please let him know he needs a different antibiotic.  It was called into his pharmacy.  Thank you  ----- Message -----  From: Meghan Castellon LPN  Sent: 12/9/2024   8:59 AM EST  To: Frank D'Amico, MD

## 2025-01-17 ENCOUNTER — PROCEDURE VISIT (OUTPATIENT)
Dept: UROLOGY | Facility: CLINIC | Age: 75
End: 2025-01-17
Payer: COMMERCIAL

## 2025-01-17 VITALS
DIASTOLIC BLOOD PRESSURE: 76 MMHG | HEART RATE: 74 BPM | TEMPERATURE: 98.2 F | SYSTOLIC BLOOD PRESSURE: 124 MMHG | OXYGEN SATURATION: 98 % | BODY MASS INDEX: 28.39 KG/M2 | WEIGHT: 170.4 LBS | HEIGHT: 65 IN

## 2025-01-17 DIAGNOSIS — N40.1 BENIGN PROSTATIC HYPERPLASIA WITH NOCTURIA: ICD-10-CM

## 2025-01-17 DIAGNOSIS — R35.1 BENIGN PROSTATIC HYPERPLASIA WITH NOCTURIA: ICD-10-CM

## 2025-01-17 DIAGNOSIS — R35.1 NOCTURIA: Primary | ICD-10-CM

## 2025-01-17 LAB
SL AMB  POCT GLUCOSE, UA: NORMAL
SL AMB LEUKOCYTE ESTERASE,UA: NORMAL
SL AMB POCT BILIRUBIN,UA: NORMAL
SL AMB POCT BLOOD,UA: NORMAL
SL AMB POCT CLARITY,UA: CLEAR
SL AMB POCT COLOR,UA: YELLOW
SL AMB POCT KETONES,UA: NORMAL
SL AMB POCT NITRITE,UA: NORMAL
SL AMB POCT PH,UA: 6
SL AMB POCT SPECIFIC GRAVITY,UA: 1.02
SL AMB POCT URINE PROTEIN: NORMAL
SL AMB POCT UROBILINOGEN: 0.2

## 2025-01-17 PROCEDURE — 87086 URINE CULTURE/COLONY COUNT: CPT | Performed by: UROLOGY

## 2025-01-17 PROCEDURE — 52000 CYSTOURETHROSCOPY: CPT | Performed by: UROLOGY

## 2025-01-17 PROCEDURE — 81003 URINALYSIS AUTO W/O SCOPE: CPT | Performed by: UROLOGY

## 2025-01-17 PROCEDURE — 87186 SC STD MICRODIL/AGAR DIL: CPT | Performed by: UROLOGY

## 2025-01-17 PROCEDURE — 99213 OFFICE O/P EST LOW 20 MIN: CPT | Performed by: UROLOGY

## 2025-01-17 PROCEDURE — 87077 CULTURE AEROBIC IDENTIFY: CPT | Performed by: UROLOGY

## 2025-01-17 RX ORDER — TAMSULOSIN HYDROCHLORIDE 0.4 MG/1
0.4 CAPSULE ORAL
COMMUNITY

## 2025-01-17 RX ORDER — CIPROFLOXACIN 500 MG/1
500 TABLET, FILM COATED ORAL EVERY 12 HOURS SCHEDULED
Qty: 14 TABLET | Refills: 0 | Status: SHIPPED | OUTPATIENT
Start: 2025-01-17 | End: 2025-01-24

## 2025-01-17 RX ORDER — CLOPIDOGREL BISULFATE 75 MG/1
75 TABLET ORAL DAILY
COMMUNITY

## 2025-01-17 RX ORDER — CIPROFLOXACIN 500 MG/1
500 TABLET, FILM COATED ORAL ONCE
Status: COMPLETED | OUTPATIENT
Start: 2025-01-17 | End: 2025-01-17

## 2025-01-17 RX ORDER — CEPHALEXIN 500 MG/1
500 CAPSULE ORAL ONCE
Status: CANCELLED | OUTPATIENT
Start: 2025-01-17 | End: 2025-01-17

## 2025-01-17 RX ADMIN — CIPROFLOXACIN 500 MG: 500 TABLET, FILM COATED ORAL at 15:31

## 2025-01-17 NOTE — PROGRESS NOTES
"   Cystoscopy     Date/Time  1/17/2025 11:01 AM     Performed by  Frank D'Amico, MD   Authorized by  Frank D'Amico, MD     Universal Protocol:  procedure performed by consultantConsent: Verbal consent not obtained. Written consent obtained.  Consent given by: patient  Time out: Immediately prior to procedure a \"time out\" was called to verify the correct patient, procedure, equipment, support staff and site/side marked as required.  Timeout called at: 1/17/2025 11:01 AM.  Patient identity confirmed: verbally with patient      Procedure Details:  Procedure type: cystoscopy    Patient tolerance: Patient tolerated the procedure well with no immediate complications    Additional Procedure Details: Patient underwent flexible digital cystoscopy.  Supine position.  Prepped with Betadine lidocaine jelly.  1 dose of Cipro.  Cystoscopy failed a normal urethra.  His prostate is open with the lateral lobes however there is a significant median lobe ball valving his bladder neck.  Trabeculated bladder small cellules.  Tiny stone in the bladder.  No tumors.  Urine culture sent.  Cipro started.  We discussed the pros and cons of a laser prostatectomy to get rid of his median lobe I believe this would likely help him a lot.      "

## 2025-01-17 NOTE — PROGRESS NOTES
UROLOGY PROGRESS NOTE         NAME: Paulo Motta  AGE: 74 y.o. SEX: male  : 1950   MRN: [id number]    DATE: 2025  TIME: 11:05 AM    Assessment and Plan      Impression:   1. Nocturia  -     POCT urine dip auto non-scope  -     Urine culture; Future  -     Urine culture  -     Cystoscopy  2. Benign prostatic hyperplasia with nocturia    Significant nocturia with BPH.  Large median lobe.  Possible UTI.  Small stone in the bladder.   Plan: Set him up for transurethral vaporization of the prostate gland to eliminate the median lobe.  We will do this once he is done traveling in April.  Culture sent Cipro started for 1 week.      Chief Complaint     Chief Complaint   Patient presents with    Cystoscopy     Cystoscopy today      History of Present Illness     HPI: Paulo Motta is a 74 y.o. year old male who presents with history of BPH status post UroLift.  He is back to his baseline he is getting up 4-5 times a night to void.  He voids a total of 500 cc through the night.  Slower flow.  He is here for cystoscopy today.    See separate cystoscopy note.              The following portions of the patient's history were reviewed and updated as appropriate: allergies, current medications, past family history, past medical history, past social history, past surgical history and problem list.  Past Medical History:   Diagnosis Date    Bladder diverticulum     BPH (benign prostatic hyperplasia)     with nocturia    Urinary retention      Past Surgical History:   Procedure Laterality Date    INGUINAL HERNIA REPAIR      KNEE ARTHROPLASTY Left 10/22/2024    OTHER SURGICAL HISTORY  10/22/2020    urolift     shoulder  Review of Systems     Const: Denies chills, fever and weight loss.  CV: Denies chest pain.  Resp: Denies SOB.  GI: Denies abdominal pain, nausea and vomiting.  : Denies symptoms other than stated above.  Musculo: Denies back pain.    Objective   /76   Pulse 74   Temp 98.2 °F (36.8 °C)    "Ht 5' 5\" (1.651 m)   Wt 77.3 kg (170 lb 6.4 oz)   SpO2 98%   BMI 28.36 kg/m²     Physical Exam  Const: Appears healthy and well developed. No signs of acute distress present.  Resp: Respirations are regular and unlabored.   CV: Rate is regular. Rhythm is regular.  Abdomen: Abdomen is soft, nontender, and nondistended. Kidneys are not palpable.  :   Psych: Patient's attitude is cooperative. Mood is normal. Affect is normal.    Current Medications     Current Outpatient Medications:     APPLE CIDER VINEGAR PO, Take 600 mg by mouth, Disp: , Rfl:     aspirin 81 mg chewable tablet, Chew 81 mg daily, Disp: , Rfl:     clopidogrel (PLAVIX) 75 mg tablet, Take 75 mg by mouth daily, Disp: , Rfl:     Coenzyme Q10 10 MG capsule, Take 100 mg by mouth daily, Disp: , Rfl:     diphenhydrAMINE-acetaminophen (TYLENOL PM)  MG TABS, Take 1 tablet by mouth daily at bedtime as needed, Disp: , Rfl:     DOCUSATE SODIUM PO, Take 1 caplet by mouth in the morning, Disp: , Rfl:     metoprolol succinate (TOPROL-XL) 25 mg 24 hr tablet, Take 12.5 mg by mouth daily, Disp: , Rfl:     Multiple Vitamin (MULTIVITAMIN PO), Take 1 tablet by mouth daily, Disp: , Rfl:     Psyllium 0.36 g CAPS, Take 1 packet by mouth in the morning, Disp: , Rfl:     rosuvastatin (CRESTOR) 20 MG tablet, Take 1 tablet by mouth in the morning, Disp: , Rfl:     senna (Senokot) 8.6 MG tablet, Take 8.6 mg by mouth daily, Disp: , Rfl:     tadalafil (CIALIS) 5 MG tablet, Take 1 tablet (5 mg total) by mouth daily as needed for erectile dysfunction (Patient taking differently: Take 5 mg by mouth in the morning), Disp: 90 tablet, Rfl: 3    tamsulosin (FLOMAX) 0.4 mg, Take 0.4 mg by mouth daily with dinner, Disp: , Rfl:     triamcinolone (KENALOG) 0.1 % ointment, APPLY TO AFFECTED AREA EVERY DAY - SPARINGLY, Disp: , Rfl:         Frank D'Amico, MD          "

## 2025-01-17 NOTE — Clinical Note
Please set him up for a laser prostatectomy in April.  He will need a culture prior.  Please set this up for any day except Friday.  He may need to spend the night.

## 2025-01-18 LAB — BACTERIA UR CULT: NORMAL

## 2025-01-20 LAB
BACTERIA UR CULT: ABNORMAL
BACTERIA UR CULT: ABNORMAL

## 2025-01-21 ENCOUNTER — TELEPHONE (OUTPATIENT)
Age: 75
End: 2025-01-21

## 2025-01-21 DIAGNOSIS — N39.0 URINARY TRACT INFECTION WITHOUT HEMATURIA, SITE UNSPECIFIED: Primary | ICD-10-CM

## 2025-01-21 RX ORDER — NITROFURANTOIN 25; 75 MG/1; MG/1
100 CAPSULE ORAL 2 TIMES DAILY
Qty: 14 CAPSULE | Refills: 0 | Status: SHIPPED | OUTPATIENT
Start: 2025-01-21

## 2025-01-21 NOTE — TELEPHONE ENCOUNTER
Low colony noted in culture.  However, patient recently underwent office cystoscopy.  Documented findings include small bladder stone and possible UTI, culture sent. Sensitivity report on this culture does not have detailed effectiveness to Cipro, which he was given. Sensitivity noted to Macrobid.  Will provide this.  He will take 100mg tablet twice daily for 7 days.  This was sent pharmacy on file

## 2025-01-21 NOTE — TELEPHONE ENCOUNTER
Patient's wife calling as instructed per Dr. D'Amico if culture comes back with infection, please review and advise: Patient is taking ciprofloxacin, asymptomatic:    Urine culture  Status: Final result      Contains abnormal data Urine culture  Order: [id number]   Status: Final result       Next appt: None       Dx: Nocturia    Test Result Released: Yes (seen)    Specimen Information: Urine   0 Result Notes  Urine Culture 60,000-69,000 cfu/ml Staphylococcus coagulase negative Abnormal    10,000-19,000 cfu/ml   Mixed Contaminants X2        Susceptibility     Staphylococcus coagulase negative     FIGUEROA     Cefazolin ($) Resistant     Gentamicin ($$) Susceptible     Nitrofurantoin Susceptible     Oxacillin Resistant     Penicillin Resistant     Tetracycline Susceptible     Trimethoprim + Sulfamethoxazole ($$$) Resistant     Vancomycin ($) Susceptible                 Specimen Collected: 01/17/25 10:40 Last Resulted: 01/20/25 10:51     Wife states they are going on a cruise next week and she may have to call to cancel if this does not resolve and hoping we can let her know next steps as soon as possible as Dr. D'Amico mentioned possible IV antibiotics.

## 2025-01-21 NOTE — TELEPHONE ENCOUNTER
Spoke with wife, she will stop the cipro and start the macrobid as ordered for Sammy. Currently has no symptoms. Will call back with any questions or concerns.

## 2025-01-21 NOTE — TELEPHONE ENCOUNTER
Left message for patient's wife to call the office back. Please relay message when she calls back.

## 2025-01-21 NOTE — TELEPHONE ENCOUNTER
Patient's wife called back, I relayed the following:  NELSON Vargas Nurse Practitioner Signed 11:44 AM       Please let patient know follow up testing is not recommended and routinely performed.  This is because people often times do have bacteria in their urine. We do not treat this finding unless specific symptoms are present. However, if patient continues to experience symptoms, we encourage them to call and report for further testing.          Patient's wife states he is not having any symptoms and would not have known about this infection, the only reason they found out is due to urine testing done prior to cysto.... and so not sure if he should take the antibiotic then at this point?    Please advise.

## 2025-01-21 NOTE — TELEPHONE ENCOUNTER
Please let patient know follow up testing is not recommended and routinely performed.  This is because people often times do have bacteria in their urine. We do not treat this finding unless specific symptoms are present. However, if patient continues to experience symptoms, we encourage them to call and report for further testing.

## 2025-01-21 NOTE — TELEPHONE ENCOUNTER
Spoke with patient and his wife. They are aware of the message and they will  the medication. Patient's wife wants to know if they can give another urine after the medication is finished due to this has happened in the past and the UTI was not cleared.

## 2025-02-06 ENCOUNTER — TELEPHONE (OUTPATIENT)
Dept: UROLOGY | Facility: CLINIC | Age: 75
End: 2025-02-06

## 2025-02-12 ENCOUNTER — PREP FOR PROCEDURE (OUTPATIENT)
Dept: UROLOGY | Facility: CLINIC | Age: 75
End: 2025-02-12

## 2025-02-12 DIAGNOSIS — Z01.818 ENCOUNTER FOR PREADMISSION TESTING: ICD-10-CM

## 2025-02-12 DIAGNOSIS — R39.89 SUSPECTED UTI: Primary | ICD-10-CM

## 2025-02-12 DIAGNOSIS — Z01.818 PREOPERATIVE CLEARANCE: ICD-10-CM

## 2025-02-12 NOTE — TELEPHONE ENCOUNTER
Spoke with patient wife and confirmed surgery date of: 5/5  Type of surgery: TURP  Operating physician: Dr. D'Amico  [facility] of surgery: [facility]    Verbally went over prep with patient wife on: 2/12  NPO  Bowel prep? No  Hospital calls afternoon prior with arrival time -Calls Friday afternoon for Monday surgeries  Patient needs ride to and from surgery (outpatient/inpatient)   Pre-op testing to be done 2 weeks prior to surgery  (CBC, BMP, UC, EKG, T&S)  Blood thinners: ASA  7 day hold requested  Clearances needed: (Cardiac)    Mailed to patient on: 2/18  Copy of packet scanned into Media on:  Labs in packet  Soap / Bowel prep in packet    Consent: on admit    Cardiac Clearance  Appt with:  Appt date and time: 4/8  Date clearance form faxed:  Best fax number:    Medication Suspension of: ASA  Ordering provider: cardiologist [facility]  Faxed Medication Suspension form on:  Best fax number:

## 2025-02-24 DIAGNOSIS — N40.1 BPH ASSOCIATED WITH NOCTURIA: ICD-10-CM

## 2025-02-24 DIAGNOSIS — R35.1 BPH ASSOCIATED WITH NOCTURIA: ICD-10-CM

## 2025-02-25 RX ORDER — TADALAFIL 5 MG/1
5 TABLET ORAL DAILY PRN
Qty: 90 TABLET | Refills: 1 | Status: SHIPPED | OUTPATIENT
Start: 2025-02-25

## 2025-04-24 RX ORDER — AMOXICILLIN 500 MG/1
CAPSULE ORAL
COMMUNITY
Start: 2025-04-03 | End: 2025-05-06

## 2025-04-24 RX ORDER — LORATADINE 10 MG/1
10 TABLET ORAL AS NEEDED
COMMUNITY

## 2025-04-24 RX ORDER — CALCIUM CARBONATE 500 MG/1
1 TABLET, CHEWABLE ORAL AS NEEDED
COMMUNITY

## 2025-04-24 RX ORDER — EZETIMIBE 10 MG/1
1 TABLET ORAL DAILY
COMMUNITY
Start: 2025-04-08

## 2025-04-24 NOTE — PRE-PROCEDURE INSTRUCTIONS
Pre-Surgery Instructions:   Medication Instructions    aspirin 81 mg chewable tablet Instructions provided by [facility] cardiology ok to hold 7 days before surgery -last dose 4/27/25    Coenzyme Q10 10 MG capsule Stop taking 7 days prior to surgery.    Diclofenac Sodium (VOLTAREN) 1 % Stop taking 3 days prior to surgery.    diphenhydrAMINE-acetaminophen (TYLENOL PM)  MG TABS Uses PRN- DO NOT take day of surgery    DOCUSATE SODIUM PO Take day of surgery.    ezetimibe (ZETIA) 10 mg tablet Take day of surgery.    Homeopathic Products (ARTHRITIS RELIEF PO) Stop taking 7 days prior to surgery.    loratadine (CLARITIN) 10 mg tablet Uses PRN- OK to take day of surgery    Menthol, Topical Analgesic, (BIOFREEZE EX) Stop taking 3 days prior to surgery.    metoprolol succinate (TOPROL-XL) 25 mg 24 hr tablet Take day of surgery.    Multiple Vitamins-Minerals (CENTRUM SILVER 50+MEN PO) Stop taking 7 days prior to surgery.    Psyllium 0.36 g CAPS Hold day of surgery.    rosuvastatin (CRESTOR) 20 MG tablet Take day of surgery.    senna (Senokot) 8.6 MG tablet Hold day of surgery.    tadalafil (CIALIS) 5 MG tablet Stop taking 1 day prior to surgery.    tamsulosin (FLOMAX) 0.4 mg Take night before surgery    triamcinolone (KENALOG) 0.1 % ointment Uses PRN- DO NOT take day of surgery    Medication instructions for day of surgery reviewed. Please take all instructed medications with only a sip of water.       You will receive a call one business day prior to surgery with an arrival time and hospital directions. If your surgery is scheduled on a Monday, the hospital will be calling you on the Friday prior to your surgery. If you have not heard from anyone by 8pm, please call the hospital supervisor through the hospital  at [phone]. ([place] [phone] or [place] [phone]).    Do not eat or drink anything after midnight the night before your surgery, including candy, mints, lifesavers, or chewing gum. Do not drink  alcohol 24hrs before your surgery. Try not to smoke at least 24hrs before your surgery.       Follow the pre surgery showering instructions as listed in the “My Surgical Experience Booklet” or otherwise provided by your surgeon's office. Do not use a blade to shave the surgical area 1 week before surgery. It is okay to use a clean electric clippers up to 24 hours before surgery. Do not apply any lotions, creams, including makeup, cologne, deodorant, or perfumes after showering on the day of your surgery. Do not use dry shampoo, hair spray, hair gel, or any type of hair products.     No contact lenses, eye make-up, or artificial eyelashes. Remove nail polish, including gel polish, and any artificial, gel, or acrylic nails if possible. Remove all jewelry including rings and body piercing jewelry.     Wear causal clothing that is easy to take on and off. Consider your type of surgery.    Keep any valuables, jewelry, piercings at home. Please bring any specially ordered equipment (sling, braces) if indicated.    Arrange for a responsible person to drive you to and from the hospital on the day of your surgery. Please confirm the visitor policy for the day of your procedure when you receive your phone call with an arrival time.     Call the surgeon's office with any new illnesses, exposures, or additional questions prior to surgery.    Please reference your “My Surgical Experience Booklet” for additional information to prepare for your upcoming surgery.

## 2025-05-04 ENCOUNTER — ANESTHESIA EVENT (OUTPATIENT)
Dept: PERIOP | Facility: HOSPITAL | Age: 75
End: 2025-05-04
Payer: COMMERCIAL

## 2025-05-04 PROBLEM — Z95.1 HISTORY OF CORONARY ARTERY BYPASS GRAFT: Status: ACTIVE | Noted: 2025-05-04

## 2025-05-04 PROBLEM — I25.10 CAD (CORONARY ARTERY DISEASE): Status: ACTIVE | Noted: 2025-05-04

## 2025-05-04 PROBLEM — I10 HTN (HYPERTENSION): Status: ACTIVE | Noted: 2025-05-04

## 2025-05-04 NOTE — ANESTHESIA PREPROCEDURE EVALUATION
Procedure:  TRANSURETHRAL RESECTION OF PROSTATE W/ LASER (Abdomen)    Relevant Problems   ANESTHESIA (within normal limits)      CARDIO   (+) CAD (coronary artery disease)   (+) HTN (hypertension)   (+) History of coronary artery bypass graft   (-) Angina at rest (HCC)   (-) Angina of effort (HCC)   (-) JEFFERY (dyspnea on exertion)      ENDO (within normal limits)      GI/HEPATIC   (-) Gastroesophageal reflux disease      /RENAL (within normal limits)      HEMATOLOGY (within normal limits)      MUSCULOSKELETAL (within normal limits)      NEURO/PSYCH (within normal limits)      PULMONARY (within normal limits)  Allergies, afebrile. Cough only secondary to post nasal drip (clear)   (-) URI (upper respiratory infection)      Cardiology clearance reviewed 4/9/25 1/2025 TTE  INTERPRETATION SUMMARY   Left ventricular size is normal.   LV wall thickness is normal.   Global systolic LV function is normal.   Est. LV Ejection Fraction is 55-60%.   LV systolic function is similar when compared to prior echo.   Mild aortic insufficiency.   Right ventricular size is increased.     Ascending Aorta dimension = 3.7 cm, prev 3.8 cm   Aortic Root dimension = 3.2 cm, prev 3.4 cm     Physical Exam    Airway    Mallampati score: IV  TM Distance: >3 FB  Neck ROM: limited     Dental   No notable dental hx     Cardiovascular  Rhythm: regular, Rate: normal    Pulmonary   Breath sounds clear to auscultation    Other Findings        Anesthesia Plan  ASA Score- 3     Anesthesia Type- general with ASA Monitors.         Additional Monitors:     Airway Plan: LMA.           Plan Factors-Exercise tolerance (METS): >4 METS.    Chart reviewed.        Patient is not a current smoker.      Obstructive sleep apnea risk education given perioperatively.        Induction- intravenous.    Postoperative Plan- Plan for postoperative opioid use.     Perioperative Resuscitation Plan - Level 1 - Full Code.       Informed Consent- Anesthetic plan and risks  discussed with patient and spouse.  I personally reviewed this patient with the CRNA. Discussed and agreed on the Anesthesia Plan with the CRNA..      NPO Status:  Vitals Value Taken Time   Date of last liquid 05/05/25 05/05/25 0705   Time of last liquid 0600 05/05/25 0705   Date of last solid 05/04/25 05/05/25 0705   Time of last solid 1800 05/05/25 0705

## 2025-05-05 ENCOUNTER — TELEPHONE (OUTPATIENT)
Dept: UROLOGY | Facility: CLINIC | Age: 75
End: 2025-05-05

## 2025-05-05 ENCOUNTER — HOSPITAL ENCOUNTER (OUTPATIENT)
Facility: HOSPITAL | Age: 75
Setting detail: OUTPATIENT SURGERY
Discharge: HOME | End: 2025-05-05
Attending: UROLOGY | Admitting: UROLOGY
Payer: COMMERCIAL

## 2025-05-05 ENCOUNTER — ANESTHESIA (OUTPATIENT)
Dept: PERIOP | Facility: HOSPITAL | Age: 75
End: 2025-05-05
Payer: COMMERCIAL

## 2025-05-05 VITALS
HEIGHT: 67 IN | RESPIRATION RATE: 12 BRPM | HEART RATE: 59 BPM | DIASTOLIC BLOOD PRESSURE: 78 MMHG | WEIGHT: 167 LBS | BODY MASS INDEX: 26.21 KG/M2 | SYSTOLIC BLOOD PRESSURE: 166 MMHG | OXYGEN SATURATION: 97 % | TEMPERATURE: 97.8 F

## 2025-05-05 DIAGNOSIS — R35.1 BENIGN PROSTATIC HYPERPLASIA WITH NOCTURIA: ICD-10-CM

## 2025-05-05 DIAGNOSIS — N40.1 BENIGN PROSTATIC HYPERPLASIA WITH NOCTURIA: ICD-10-CM

## 2025-05-05 DIAGNOSIS — R35.1 BPH ASSOCIATED WITH NOCTURIA: Primary | ICD-10-CM

## 2025-05-05 DIAGNOSIS — N40.1 BPH ASSOCIATED WITH NOCTURIA: Primary | ICD-10-CM

## 2025-05-05 LAB
ABO GROUP BLD: NORMAL
BLD GP AB SCN SERPL QL: NEGATIVE
RH BLD: POSITIVE
SPECIMEN EXPIRATION DATE: NORMAL

## 2025-05-05 PROCEDURE — 86850 RBC ANTIBODY SCREEN: CPT | Performed by: UROLOGY

## 2025-05-05 PROCEDURE — 52648 LASER SURGERY OF PROSTATE: CPT | Performed by: UROLOGY

## 2025-05-05 PROCEDURE — 88305 TISSUE EXAM BY PATHOLOGIST: CPT | Performed by: PATHOLOGY

## 2025-05-05 PROCEDURE — NC001 PR NO CHARGE: Performed by: UROLOGY

## 2025-05-05 PROCEDURE — 86901 BLOOD TYPING SEROLOGIC RH(D): CPT | Performed by: UROLOGY

## 2025-05-05 PROCEDURE — 86900 BLOOD TYPING SEROLOGIC ABO: CPT | Performed by: UROLOGY

## 2025-05-05 RX ORDER — LIDOCAINE HYDROCHLORIDE 10 MG/ML
INJECTION, SOLUTION EPIDURAL; INFILTRATION; INTRACAUDAL; PERINEURAL AS NEEDED
Status: DISCONTINUED | OUTPATIENT
Start: 2025-05-05 | End: 2025-05-05

## 2025-05-05 RX ORDER — DEXAMETHASONE SODIUM PHOSPHATE 10 MG/ML
INJECTION, SOLUTION INTRAMUSCULAR; INTRAVENOUS AS NEEDED
Status: DISCONTINUED | OUTPATIENT
Start: 2025-05-05 | End: 2025-05-05

## 2025-05-05 RX ORDER — GUAIFENESIN 600 MG/1
1200 TABLET, EXTENDED RELEASE ORAL EVERY 12 HOURS SCHEDULED
COMMUNITY

## 2025-05-05 RX ORDER — ONDANSETRON 2 MG/ML
4 INJECTION INTRAMUSCULAR; INTRAVENOUS ONCE AS NEEDED
Status: DISCONTINUED | OUTPATIENT
Start: 2025-05-05 | End: 2025-05-05 | Stop reason: HOSPADM

## 2025-05-05 RX ORDER — FENTANYL CITRATE 50 UG/ML
INJECTION, SOLUTION INTRAMUSCULAR; INTRAVENOUS AS NEEDED
Status: DISCONTINUED | OUTPATIENT
Start: 2025-05-05 | End: 2025-05-05

## 2025-05-05 RX ORDER — HYDROMORPHONE HCL IN WATER/PF 6 MG/30 ML
0.2 PATIENT CONTROLLED ANALGESIA SYRINGE INTRAVENOUS
Status: DISCONTINUED | OUTPATIENT
Start: 2025-05-05 | End: 2025-05-05 | Stop reason: HOSPADM

## 2025-05-05 RX ORDER — SODIUM CHLORIDE, SODIUM LACTATE, POTASSIUM CHLORIDE, CALCIUM CHLORIDE 600; 310; 30; 20 MG/100ML; MG/100ML; MG/100ML; MG/100ML
INJECTION, SOLUTION INTRAVENOUS CONTINUOUS PRN
Status: DISCONTINUED | OUTPATIENT
Start: 2025-05-05 | End: 2025-05-05

## 2025-05-05 RX ORDER — FENTANYL CITRATE/PF 50 MCG/ML
25 SYRINGE (ML) INJECTION
Status: DISCONTINUED | OUTPATIENT
Start: 2025-05-05 | End: 2025-05-05 | Stop reason: HOSPADM

## 2025-05-05 RX ORDER — CEFAZOLIN SODIUM 2 G/50ML
2000 SOLUTION INTRAVENOUS ONCE
Status: COMPLETED | OUTPATIENT
Start: 2025-05-05 | End: 2025-05-05

## 2025-05-05 RX ORDER — SODIUM CHLORIDE, SODIUM LACTATE, POTASSIUM CHLORIDE, CALCIUM CHLORIDE 600; 310; 30; 20 MG/100ML; MG/100ML; MG/100ML; MG/100ML
75 INJECTION, SOLUTION INTRAVENOUS CONTINUOUS
Status: DISCONTINUED | OUTPATIENT
Start: 2025-05-05 | End: 2025-05-05 | Stop reason: HOSPADM

## 2025-05-05 RX ORDER — ONDANSETRON 2 MG/ML
INJECTION INTRAMUSCULAR; INTRAVENOUS AS NEEDED
Status: DISCONTINUED | OUTPATIENT
Start: 2025-05-05 | End: 2025-05-05

## 2025-05-05 RX ORDER — PROPOFOL 10 MG/ML
INJECTION, EMULSION INTRAVENOUS AS NEEDED
Status: DISCONTINUED | OUTPATIENT
Start: 2025-05-05 | End: 2025-05-05

## 2025-05-05 RX ORDER — CEFUROXIME AXETIL 500 MG/1
500 TABLET ORAL EVERY 12 HOURS SCHEDULED
Qty: 14 TABLET | Refills: 0 | Status: SHIPPED | OUTPATIENT
Start: 2025-05-05 | End: 2025-05-12

## 2025-05-05 RX ORDER — MAGNESIUM HYDROXIDE 1200 MG/15ML
LIQUID ORAL AS NEEDED
Status: DISCONTINUED | OUTPATIENT
Start: 2025-05-05 | End: 2025-05-05 | Stop reason: HOSPADM

## 2025-05-05 RX ADMIN — CEFAZOLIN SODIUM 2000 MG: 2 SOLUTION INTRAVENOUS at 08:24

## 2025-05-05 RX ADMIN — FENTANYL CITRATE 25 MCG: 0.05 INJECTION, SOLUTION INTRAMUSCULAR; INTRAVENOUS at 08:30

## 2025-05-05 RX ADMIN — FENTANYL CITRATE 25 MCG: 0.05 INJECTION, SOLUTION INTRAMUSCULAR; INTRAVENOUS at 09:02

## 2025-05-05 RX ADMIN — DEXAMETHASONE SODIUM PHOSPHATE 10 MG: 10 INJECTION, SOLUTION INTRAMUSCULAR; INTRAVENOUS at 08:31

## 2025-05-05 RX ADMIN — FENTANYL CITRATE 25 MCG: 0.05 INJECTION, SOLUTION INTRAMUSCULAR; INTRAVENOUS at 08:34

## 2025-05-05 RX ADMIN — PROPOFOL 140 MG: 10 INJECTION, EMULSION INTRAVENOUS at 08:28

## 2025-05-05 RX ADMIN — LIDOCAINE HYDROCHLORIDE 50 MG: 10 INJECTION, SOLUTION EPIDURAL; INFILTRATION; INTRACAUDAL; PERINEURAL at 08:28

## 2025-05-05 RX ADMIN — SODIUM CHLORIDE, SODIUM LACTATE, POTASSIUM CHLORIDE, AND CALCIUM CHLORIDE: .6; .31; .03; .02 INJECTION, SOLUTION INTRAVENOUS at 08:24

## 2025-05-05 RX ADMIN — FENTANYL CITRATE 25 MCG: 0.05 INJECTION, SOLUTION INTRAMUSCULAR; INTRAVENOUS at 09:18

## 2025-05-05 RX ADMIN — ONDANSETRON 4 MG: 2 INJECTION INTRAMUSCULAR; INTRAVENOUS at 08:31

## 2025-05-05 NOTE — H&P
H&P Exam - Urology   Paulo Motta 75 y.o. male MRN: [id number]  Unit/Bed#: OR [place] Encounter: [id number]    Assessment & Plan     Assessment:  BPH with obstruction status post UroLift a large median lobe.  For laser prostatectomy.  Plan:  Cystoscopy with laser vaporization of the prostate.    History of Present Illness   HPI:  Paulo Motta is a 75 y.o. male who presents with history of BPH history of urinary retention.  Patient status post UroLift.  Has significant retention of urine now.  He is a large median lobe.  After discussing the options the patient wishes to proceed with a laser prostatectomy.  Pros cons options outcomes discussed..    Review of Systems:  Const: Denies chills, fever and weight loss.  CV: Denies chest pain.  Resp: Denies SOB.  GI: Denies abdominal pain, nausea and vomiting.  : Denies symptoms other than stated above.  Musculo: Denies back pain.    Historical Information   Past Medical History:   Diagnosis Date    Arthritis     Bladder diverticulum     BPH (benign prostatic hyperplasia)     with nocturia    Coronary artery disease     Enlarged prostate     Exercise involving walking     daily    History of anemia     Hyperlipidemia     Nocturia     Tinnitus     occas    Urinary retention     Wears glasses      Past Surgical History:   Procedure Laterality Date    COLONOSCOPY      CORONARY ARTERY BYPASS GRAFT  02/23/2024    x3---[facility]    FRACTURE SURGERY      teenager    INGUINAL HERNIA REPAIR      KNEE ARTHROPLASTY Left 10/22/2024    OTHER SURGICAL HISTORY  10/22/2020    urolift    THORACENTESIS      after CABG     Social History   Social History     Substance and Sexual Activity   Alcohol Use Not Currently     Social History     Substance and Sexual Activity   Drug Use Never     Social History     Tobacco Use   Smoking Status Never   Smokeless Tobacco Never     E-Cigarette/Vaping    E-Cigarette Use Never User      E-Cigarette/Vaping Substances    Nicotine  "No     THC No     CBD No     Flavoring No     Other No     Unknown No      Family History: non-contributory    Meds/Allergies   all medications and allergies reviewed  No Known Allergies    Objective   Vitals: Blood pressure 153/74, pulse 66, temperature (!) 97.3 °F (36.3 °C), temperature source Temporal, resp. rate 18, height 5' 7\" (1.702 m), weight 75.8 kg (167 lb), SpO2 97%.    No intake/output data recorded.    Invasive Devices       None                   Physical Exam:  Physical Exam  Const: Appears healthy and well developed. No signs of acute distress present.  Resp: Respirations are regular and unlabored.   CV: Rate is regular. Rhythm is regular.  Abdomen: Abdomen is soft, nontender, and nondistended. Kidneys are not palpable.  :   Psych: Patient's attitude is cooperative. Mood is normal. Affect is normal.    Lab Results: I have personally reviewed pertinent reports.    Imaging: I have personally reviewed pertinent reports.   and I have personally reviewed pertinent films in PACS  EKG, Pathology, and Other Studies: I have personally reviewed pertinent reports.   and I have personally reviewed pertinent films in PACS  VTE Prophylaxis: Sequential compression device (Venodyne)     Code Status: No Order  Advance Directive and Living Will:      Power of :    POLST:      Counseling / Coordination of Care  Total floor / unit time spent today 15 minutes.  Greater than 50% of total time was spent with the patient and / or family counseling and / or coordination of care.  A description of the counseling / coordination of care: .     "

## 2025-05-05 NOTE — OP NOTE
OPERATIVE REPORT  PATIENT NAME: Paulo Motta    :  1950  MRN: [id number]  Pt Location: [facility] OR ROOM 01    SURGERY DATE: 2025    Surgeons and Role:     * Frank D'Amico, MD - Primary    Preop Diagnosis:  Benign prostatic hyperplasia with nocturia [N40.1, R35.1]    Post-Op Diagnosis Codes:     * Benign prostatic hyperplasia with nocturia [N40.1, R35.1]    Procedure(s):  TRANSURETHRAL RESECTION OF PROSTATE W/ LASER    Specimen(s):  ID Type Source Tests Collected by Time Destination   1 : prostate tissue Tissue Prostate TISSUE EXAM Frank D'Amico, MD 2025  9:22 AM        Estimated Blood Loss:   Minimal    Drains:  Urethral Catheter Latex;Three way 22 Fr. (Active)   Site Assessment Clean;Skin intact 25 0935   Collection Container Standard drainage bag 25 0935   Securement Method Securing device (Describe) 25 0935   Irrigant Normal saline 25 0935   Urethral Irrigation Intake (mL) 1000 mL 25 0947   Output (mL) 1000 mL 25 0947   Number of days: 0       Anesthesia Type:   General/LMA    Operative Indications:  Benign prostatic hyperplasia with nocturia [N40.1, R35.1]      Operative Findings:  Large median lobe.  2 UroLift clips removed.  Home with Genao voiding trial tomorrow.  Prescription for Ceftin called in.      Complications:   None    Procedure and Technique:  Patient brought the operating room.  Hard timeout.  Dorsolithotomy position.  Antibiotics on board.  Prepped and draped in sterile fashion.  SCDs in place.  Resectoscope sheath placed without difficulty.  Continuous-flow started with saline.  900 µm laser fiber used to vaporize the prostate gland.  Thulium laser used 129,000 J used.  Excellent hemostasis.  No stones in the bladder.  No tumors in the bladder.  Orifices normal.  2 UroLift clips that were posterior were removed after cutting the suture with the laser.  The majority of the procedure was used to vaporize the median lobe and posterior prostate.  The  lateral lobes were out of the way.  A nice open channel at the end of the procedure was noted.  Some debris in the bladder was removed.  Grasping forceps was used to remove the larger fragments which was sent to the pathologist.  22 Khmer Genao with 20 cc in the balloon and CBI was started with clear E flux of urine.  No injury to the urethra or bladder was noted.  The orifices were normal.  Patient Toller procedure well.  Instructions for his catheter were made and he was sent home with his Genao.  He will follow-up for voiding trial tomorrow.  Instructions discussed.   I was present for the entire procedure.    Patient Disposition:  PACU              SIGNATURE: Frank D'Amico, MD  DATE: May 5, 2025  TIME: 10:04 AM

## 2025-05-05 NOTE — ANESTHESIA POSTPROCEDURE EVALUATION
Post-Op Assessment Note    CV Status:  Stable    Pain management: adequate       Mental Status:  Alert and awake   Hydration Status:  Euvolemic   PONV Controlled:  Controlled   Airway Patency:  Patent  Two or more mitigation strategies used for obstructive sleep apnea   Post Op Vitals Reviewed: Yes    No anethesia notable event occurred.    Staff: Anesthesiologist         Last Filed PACU Vitals:  Vitals Value Taken Time   Temp 97.7 °F (36.5 °C) 05/05/25 0950   Pulse 56 05/05/25 0950   /87 05/05/25 0950   Resp 12 05/05/25 0950   SpO2 98 % 05/05/25 0950       Modified Nell:     Vitals Value Taken Time   Activity 2 05/05/25 0950   Respiration 2 05/05/25 0950   Circulation 2 05/05/25 0950   Consciousness 2 05/05/25 0950   Oxygen Saturation 2 05/05/25 0950     Modified Nell Score: 10

## 2025-05-05 NOTE — TELEPHONE ENCOUNTER
----- Message from Frank D'Amico, MD sent at 5/5/2025 10:02 AM EDT -----  Please set him up for a voiding trial tomorrow.  Thank you

## 2025-05-05 NOTE — ANESTHESIA POSTPROCEDURE EVALUATION
Post-Op Assessment Note    CV Status:  Stable    Pain management: satisfactory to patient       Mental Status:  Sleepy   Hydration Status:  Stable   PONV Controlled:  None   Airway Patency:  Patent     Post Op Vitals Reviewed: Yes    No anethesia notable event occurred.    Staff: CRNA           Last Filed PACU Vitals:  Vitals Value Taken Time   Temp 97.7    Pulse 61 05/05/25 0936   /77    Resp 14    SpO2 100 % 05/05/25 0936   Vitals shown include unfiled device data.

## 2025-05-06 ENCOUNTER — PROCEDURE VISIT (OUTPATIENT)
Dept: UROLOGY | Facility: CLINIC | Age: 75
End: 2025-05-06
Payer: COMMERCIAL

## 2025-05-06 VITALS
DIASTOLIC BLOOD PRESSURE: 60 MMHG | SYSTOLIC BLOOD PRESSURE: 126 MMHG | TEMPERATURE: 97.1 F | HEIGHT: 67 IN | OXYGEN SATURATION: 97 % | BODY MASS INDEX: 27.47 KG/M2 | WEIGHT: 175 LBS | HEART RATE: 76 BPM

## 2025-05-06 DIAGNOSIS — N40.1 BPH ASSOCIATED WITH NOCTURIA: Primary | ICD-10-CM

## 2025-05-06 DIAGNOSIS — R35.1 BPH ASSOCIATED WITH NOCTURIA: Primary | ICD-10-CM

## 2025-05-06 PROCEDURE — 99024 POSTOP FOLLOW-UP VISIT: CPT

## 2025-05-06 PROCEDURE — 51700 IRRIGATION OF BLADDER: CPT

## 2025-05-06 RX ORDER — CEPHALEXIN 500 MG/1
500 CAPSULE ORAL ONCE
Status: COMPLETED | OUTPATIENT
Start: 2025-05-06 | End: 2025-05-06

## 2025-05-06 RX ADMIN — CEPHALEXIN 500 MG: 500 CAPSULE ORAL at 09:57

## 2025-05-07 NOTE — PROGRESS NOTES
"5/6/2025    Paulo Motta  1950  [id number]    Diagnosis  Chief Complaint    void trial         Patient presents for void trial managed by Dr. D'Amico    Plan  Instilled 100 ml sterile water. Pt immediately voided clear urine with no blood or clots. Removed 22 Fr 30 ml latex malin catheter without difficulty.     Procedure Malin removal/voiding trial    Malin catheter removed after deflation of an intact balloon. Patient tolerated well. Encouraged patient to hydrate well and return this afternoon for post void residual.  he knows he may return early if uncomfortable and unable to urinate. Patient agrees to this plan. Irrigation of bladder    Date/Time: 5/6/2025 8:00 AM    Performed by: Niharika Tirado  Authorized by: Frank D'Amico, MD    Patient location:  Bedside  Other Assisting Provider: No    Consent:     Consent obtained:  Verbal    Consent given by:  Patient    Alternatives discussed:  No treatment  Universal protocol:     Procedure explained and questions answered to patient or proxy's satisfaction: yes      Patient identity confirmed:  Verbally with patient  Pre-procedure details:     Procedure purpose:  Diagnostic    Preparation: Patient was prepped and draped in usual sterile fashion    Anesthesia (see MAR for exact dosages):     Anesthesia method:  None  Procedure details:     Bladder irrigation: yes      Urine characteristics:  Clear  Post-procedure details:     Patient tolerance of procedure:  Tolerated well, no immediate complications           No results found for this or any previous visit (from the past 4 hours).        Vitals:    05/06/25 0804   BP: 126/60   BP Location: Left arm   Patient Position: Sitting   Pulse: 76   Temp: (!) 97.1 °F (36.2 °C)   SpO2: 97%   Weight: 79.4 kg (175 lb)   Height: 5' 7\" (1.702 m)           Niharika Tirado       "

## 2025-05-09 ENCOUNTER — TELEPHONE (OUTPATIENT)
Dept: UROLOGY | Facility: CLINIC | Age: 75
End: 2025-05-09

## 2025-05-09 PROCEDURE — 88305 TISSUE EXAM BY PATHOLOGIST: CPT | Performed by: PATHOLOGY

## 2025-05-09 NOTE — TELEPHONE ENCOUNTER
Spoke with pt's wife, she is driving and pt is at home he does not answer the phone.  She will call back this afternoon to book an appt with Dr. D'Amico as one month follow up. He was placed on nurses schedule 6/2/25 but that is not needed. He needs to see Dr. D'Amico beginning of June please.  Book when she calls back.

## 2025-05-09 NOTE — TELEPHONE ENCOUNTER
Wife of the patient was returning a call to the office to schedule the follow up with Dr. D'amico that was accidentally scheduled as a nurse visit.     Tried to reschedule, but next available with Dr. D'Amico is not until Sept.     Can this please be reviewed to get the patient in for June as requested?    Wife of the patient would appreciate a return call to schedule.

## 2025-05-12 NOTE — TELEPHONE ENCOUNTER
Patient's spouse called in stating they are leaving out of state on 6/5 and will need an appointment any day before then. Please advise.

## 2025-05-30 ENCOUNTER — OFFICE VISIT (OUTPATIENT)
Dept: UROLOGY | Facility: CLINIC | Age: 75
End: 2025-05-30
Payer: COMMERCIAL

## 2025-05-30 VITALS
SYSTOLIC BLOOD PRESSURE: 118 MMHG | TEMPERATURE: 98.9 F | HEART RATE: 87 BPM | DIASTOLIC BLOOD PRESSURE: 64 MMHG | WEIGHT: 175.6 LBS | OXYGEN SATURATION: 98 % | BODY MASS INDEX: 27.56 KG/M2 | HEIGHT: 67 IN

## 2025-05-30 DIAGNOSIS — N40.1 BPH ASSOCIATED WITH NOCTURIA: Primary | ICD-10-CM

## 2025-05-30 DIAGNOSIS — R35.1 BPH ASSOCIATED WITH NOCTURIA: Primary | ICD-10-CM

## 2025-05-30 LAB
SL AMB  POCT GLUCOSE, UA: NORMAL
SL AMB LEUKOCYTE ESTERASE,UA: NORMAL
SL AMB POCT BILIRUBIN,UA: NORMAL
SL AMB POCT BLOOD,UA: NORMAL
SL AMB POCT CLARITY,UA: CLEAR
SL AMB POCT COLOR,UA: YELLOW
SL AMB POCT KETONES,UA: NORMAL
SL AMB POCT NITRITE,UA: NORMAL
SL AMB POCT PH,UA: 5.5
SL AMB POCT SPECIFIC GRAVITY,UA: 1.02
SL AMB POCT URINE PROTEIN: NORMAL
SL AMB POCT UROBILINOGEN: 0.2

## 2025-05-30 PROCEDURE — 81003 URINALYSIS AUTO W/O SCOPE: CPT | Performed by: UROLOGY

## 2025-05-30 PROCEDURE — 99024 POSTOP FOLLOW-UP VISIT: CPT | Performed by: UROLOGY

## 2025-05-30 RX ORDER — TADALAFIL 5 MG/1
5 TABLET ORAL DAILY PRN
Qty: 90 TABLET | Refills: 3 | Status: SHIPPED | OUTPATIENT
Start: 2025-05-30

## 2025-05-30 NOTE — PROGRESS NOTES
"UROLOGY PROGRESS NOTE         NAME: Paulo Motta  AGE: 75 y.o. SEX: male  : 1950   MRN: [id number]    DATE: 2025  TIME: 12:06 PM    Assessment and Plan      Impression:   1. BPH associated with nocturia  -     POCT urine dip auto non-scope    Status post laser prostatectomy.  He is doing well.   Plan: He will continue the Cialis 5 mg daily to help with the sexual dysfunction.  Will see him in 1 year sooner if he has any troubles.  His PCP Dr. Rowley does check his PSA.      Chief Complaint     Chief Complaint   Patient presents with    Follow-up     History of Present Illness     HPI: Paulo Motta is a 75 y.o. year old male who presents with status post laser prostatectomy a month ago.  He is doing well with that procedure.  All of his symptoms are improving.  Urgency frequency nocturia and flow.  He is happy with the outcome.  We did discuss that things would likely slowly improve.  He will speak up if he has any issues.  No restrictions at this point.              The following portions of the patient's history were reviewed and updated as appropriate: allergies, current medications, past family history, past medical history, past social history, past surgical history and problem list.  Past Medical History[1]  Past Surgical History[2]  shoulder  Review of Systems     Const: Denies chills, fever and weight loss.  CV: Denies chest pain.  Resp: Denies SOB.  GI: Denies abdominal pain, nausea and vomiting.  : Denies symptoms other than stated above.  Musculo: Denies back pain.    Objective   /64   Pulse 87   Temp 98.9 °F (37.2 °C)   Ht 5' 7\" (1.702 m)   Wt 79.7 kg (175 lb 9.6 oz)   SpO2 98%   BMI 27.50 kg/m²     Physical Exam  Const: Appears healthy and well developed. No signs of acute distress present.  Resp: Respirations are regular and unlabored.   CV: Rate is regular. Rhythm is regular.  Abdomen: Abdomen is soft, nontender, and nondistended. Kidneys are not palpable.  : " Penis testicles epididymis nontender no hernias.  Psych: Patient's attitude is cooperative. Mood is normal. Affect is normal.    Current Medications   Current Medications[3]        Frank D'Amico, MD             [1]   Past Medical History:  Diagnosis Date    Arthritis     Bladder diverticulum     BPH (benign prostatic hyperplasia)     with nocturia    Coronary artery disease     Enlarged prostate     Exercise involving walking     daily    History of anemia     Hyperlipidemia     Nocturia     Tinnitus     occas    Urinary retention     Wears glasses    [2]   Past Surgical History:  Procedure Laterality Date    COLONOSCOPY      CORONARY ARTERY BYPASS GRAFT  02/23/2024    x3---[facility]    FRACTURE SURGERY      teenager    INGUINAL HERNIA REPAIR      KNEE ARTHROPLASTY Left 10/22/2024    OTHER SURGICAL HISTORY  10/22/2020    urolift    THORACENTESIS      after CABG    TRANSURETHRAL RESECTION OF PROSTATE N/A 5/5/2025    Procedure: TRANSURETHRAL RESECTION OF PROSTATE W/ LASER;  Surgeon: Frank D'Amico, MD;  Location:  MAIN OR;  Service: Urology   [3]   Current Outpatient Medications:     aspirin 81 mg chewable tablet, Chew 81 mg daily, Disp: , Rfl:     Coenzyme Q10 10 MG capsule, Take 100 mg by mouth in the morning., Disp: , Rfl:     Diclofenac Sodium (VOLTAREN) 1 %, Apply 2 g topically in the morning and 2 g before bedtime., Disp: , Rfl:     DOCUSATE SODIUM PO, Take 1 caplet by mouth in the morning, Disp: , Rfl:     Menthol, Topical Analgesic, (BIOFREEZE EX), Apply topically, Disp: , Rfl:     metoprolol succinate (TOPROL-XL) 25 mg 24 hr tablet, Take 12.5 mg by mouth in the morning., Disp: , Rfl:     Multiple Vitamins-Minerals (CENTRUM SILVER 50+MEN PO), Take by mouth, Disp: , Rfl:     Psyllium 0.36 g CAPS, Take 1 packet by mouth every evening, Disp: , Rfl:     rosuvastatin (CRESTOR) 20 MG tablet, Take 1 tablet by mouth in the morning, Disp: , Rfl:     senna (Senokot) 8.6 MG tablet, Take 8.6 mg by  mouth in the morning., Disp: , Rfl:     tadalafil (CIALIS) 5 MG tablet, Take 1 tablet (5 mg total) by mouth daily as needed for erectile dysfunction, Disp: 90 tablet, Rfl: 1    triamcinolone (KENALOG) 0.1 % ointment, as needed, Disp: , Rfl:     calcium carbonate (Tums) 500 mg chewable tablet, Chew 1 tablet as needed for indigestion or heartburn, Disp: , Rfl:     diphenhydrAMINE-acetaminophen (TYLENOL PM)  MG TABS, Take 1 tablet by mouth daily at bedtime as needed, Disp: , Rfl:     ezetimibe (ZETIA) 10 mg tablet, Take 1 tablet by mouth daily (Patient not taking: Reported on 5/30/2025), Disp: , Rfl:     guaiFENesin (MUCINEX) 600 mg 12 hr tablet, Take 1,200 mg by mouth every 12 (twelve) hours, Disp: , Rfl:     loratadine (CLARITIN) 10 mg tablet, Take 10 mg by mouth if needed for allergies, Disp: , Rfl:     tamsulosin (FLOMAX) 0.4 mg, Take 0.4 mg by mouth daily with dinner (Patient not taking: Reported on 5/6/2025), Disp: , Rfl:

## (undated) DEVICE — URO CATCHER BAG STERILE 0-UC32

## (undated) DEVICE — INVIEW CLEAR LEGGINGS: Brand: CONVERTORS

## (undated) DEVICE — Device: Brand: LEVEL 1

## (undated) DEVICE — MAT FLOOR STEP DRI 24 X 36 IN N-STRL

## (undated) DEVICE — DISPOSABLE OR TOWEL: Brand: CARDINAL HEALTH

## (undated) DEVICE — SYRINGE,TOOMEY,IRRIGATION,70CC,STERILE: Brand: MEDLINE

## (undated) DEVICE — 4-PORT MANIFOLD: Brand: NEPTUNE 2

## (undated) DEVICE — Device

## (undated) DEVICE — BAG URINE DRAINAGE 4000ML CONTINUOUS IRR

## (undated) DEVICE — UROLOGIC DRAIN BAG: Brand: UNBRANDED

## (undated) DEVICE — CATHETER PLUG WITH CAP: Brand: DOVER

## (undated) DEVICE — GLOVE SRG BIOGEL 6.5

## (undated) DEVICE — SINGLE PORT MANIFOLD: Brand: NEPTUNE 2

## (undated) DEVICE — TUBING SUCTION 5MM X 12 FT

## (undated) DEVICE — SCD SEQUENTIAL COMPRESSION COMFORT SLEEVE MEDIUM KNEE LENGTH: Brand: KENDALL SCD

## (undated) DEVICE — CYSTO TUBING TUR Y IRRIGATION

## (undated) DEVICE — TELFA ADHESIVE ISLAND DRESSING: Brand: TELFA

## (undated) DEVICE — LUBRICANT JELLY SURGILUBE TUBE 4OZ FLIP TOP

## (undated) DEVICE — BASIC SINGLE BASIN 2-LF: Brand: MEDLINE INDUSTRIES, INC.

## (undated) DEVICE — PACK TUR

## (undated) DEVICE — GAUZE SPONGES,16 PLY: Brand: CURITY

## (undated) DEVICE — WET SKIN PREP TRAY: Brand: MEDLINE INDUSTRIES, INC.